# Patient Record
Sex: MALE | Race: WHITE | NOT HISPANIC OR LATINO | Employment: FULL TIME | URBAN - NONMETROPOLITAN AREA
[De-identification: names, ages, dates, MRNs, and addresses within clinical notes are randomized per-mention and may not be internally consistent; named-entity substitution may affect disease eponyms.]

---

## 2017-02-19 ENCOUNTER — OFFICE VISIT - GICH (OUTPATIENT)
Dept: FAMILY MEDICINE | Facility: OTHER | Age: 42
End: 2017-02-19

## 2017-02-19 ENCOUNTER — HISTORY (OUTPATIENT)
Dept: FAMILY MEDICINE | Facility: OTHER | Age: 42
End: 2017-02-19

## 2017-02-19 DIAGNOSIS — H10.32 ACUTE CONJUNCTIVITIS OF LEFT EYE: ICD-10-CM

## 2017-10-20 ENCOUNTER — AMBULATORY - GICH (OUTPATIENT)
Dept: RADIOLOGY | Facility: OTHER | Age: 42
End: 2017-10-20

## 2017-10-20 DIAGNOSIS — N50.9 DISORDER OF MALE GENITAL ORGANS: ICD-10-CM

## 2017-10-24 ENCOUNTER — HISTORY (OUTPATIENT)
Dept: UROLOGY | Facility: OTHER | Age: 42
End: 2017-10-24

## 2017-10-24 ENCOUNTER — AMBULATORY - GICH (OUTPATIENT)
Dept: UROLOGY | Facility: OTHER | Age: 42
End: 2017-10-24

## 2017-10-24 ENCOUNTER — AMBULATORY - GICH (OUTPATIENT)
Dept: RADIOLOGY | Facility: OTHER | Age: 42
End: 2017-10-24

## 2017-10-24 ENCOUNTER — HOSPITAL ENCOUNTER (OUTPATIENT)
Dept: RADIOLOGY | Facility: OTHER | Age: 42
End: 2017-10-24

## 2017-10-24 DIAGNOSIS — N50.9 DISORDER OF MALE GENITAL ORGANS: ICD-10-CM

## 2017-10-24 DIAGNOSIS — F17.200 NICOTINE DEPENDENCE, UNCOMPLICATED: ICD-10-CM

## 2017-10-24 DIAGNOSIS — L30.9 DERMATITIS: ICD-10-CM

## 2017-10-24 DIAGNOSIS — R39.89 OTHER SYMPTOMS AND SIGNS INVOLVING THE GENITOURINARY SYSTEM: ICD-10-CM

## 2017-10-24 DIAGNOSIS — N20.0 CALCULUS OF KIDNEY: ICD-10-CM

## 2017-10-30 ENCOUNTER — OFFICE VISIT - GICH (OUTPATIENT)
Dept: UROLOGY | Facility: OTHER | Age: 42
End: 2017-10-30

## 2017-10-30 ENCOUNTER — HISTORY (OUTPATIENT)
Dept: UROLOGY | Facility: OTHER | Age: 42
End: 2017-10-30

## 2017-10-30 DIAGNOSIS — N50.3 CYST OF EPIDIDYMIS: ICD-10-CM

## 2017-12-28 NOTE — PROGRESS NOTES
Patient Information     Patient Name MRN Sex Ronni Jiang [id number] Male 1975      Progress Notes by Kaden Chen MD at 10/30/2017  8:45 AM     Author:  Kaden Chen MD Service:  (none) Author Type:  Physician     Filed:  10/30/2017  9:20 AM Encounter Date:  10/30/2017 Status:  Signed     :  Kaden Chen MD (Physician)            I was asked to see this patient by Merle Ge MD and provide my opinion about the following:  Scrotal mass    Type of Visit  Consult    Chief Complaint  Scrotal mass    HPI  Mr. Bean is a 41 y.o. male who presents with concerns regarding left testicle.  He underwent scrotal US last week.  He first noticed this last year.  It has slowly increased in size.  It is asymptomatic.    Outside hospital records reviewed in detail today      Past Medical History  He  has a past medical history of Kidney stone; Scrotal mass; and Tobacco use disorder.  Patient Active Problem List     Diagnosis  Code     Hypertension I10     Hydrocele N43.3     Tobacco abuse Z72.0     Acute bacterial conjunctivitis H10.30     Scrotal mass N50.9     Kidney stone N20.0     Tobacco use disorder F17.200     Abnormal urine color R39.89     Dermatitis L30.9       Past Surgical History  He  has a past surgical history that includes scrotal.    Medications  He has a current medication list which includes the following prescription(s): ibuprofen and triamcinolone.    Allergies  Allergies      Allergen   Reactions     Fish Oil  Nausea Only     Sulfasalazine  Nausea And Vomiting     Other [Unlisted Allergen (Include Detail In Comments)]  Other - Describe In Comment Field     Pt states almost  from MMR vaccine.       Sulfa (Sulfonamide Antibiotics)  Vomiting       Social History  He  reports that he has been smoking Cigarettes.  He has been smoking about 1.00 pack per day. He has never used smokeless tobacco. He reports that he drinks alcohol. He reports that he does not use illicit  drugs.  No drug abuse.    Family History  No family history of urologic cancers    Review of Systems  I personally reviewed the ROS with the patient.    Nursing Notes:   Teresita Solomon ERIC  10/30/2017  9:03 AM  Signed  Patient is here today for a consult for a scrotal mass, stones, Ultrasound on 10/24/17.     Review of Systems:    Weight loss:    No     Recent fever/chills:  No   Night sweats:   No  Current skin rash:  No   Recent hair loss:  No  Heat intolerance:  No   Cold intolerance:  No  Chest pain:   No   Palpitations:   No  Shortness of breath:  No   Wheezing:   No  Constipation:    No   Diarrhea:   No   Nausea:   No   Vomiting:   No   Kidney/side pain:  No   Back pain:   No  Frequent headaches:  yes   Dizziness:     No  Leg swelling:   No   Calf pain:    No    Parents, brothers or sisters with history of kidney cancer?   No  Parents, brothers or sisters with history of bladder cancer: No    Teresita Solomon LPN......................10/30/2017 8:42 AM        Physical Exam  Vitals:     10/30/17 0843   BP: 148/100   Resp: 20   Weight: 65.4 kg (144 lb 3.2 oz)     Constitutional: No acute distress.  Alert and cooperative   Head: NCAT  Eyes: Conjunctivae normal  Cardiovascular: Regular rate.  Pulmonary/Chest: Respirations are even and non-labored bilaterally, no audible wheezing  Abdominal: Soft. No distension, tenderness, masses or guarding.   Neurological: A + O x 3.  Cranial Nerves II-XII grossly intact.  Extremities: CORINA x 4, Warm. No clubbing.  No cyanosis.    Skin: Pink, warm and dry.  No visible rashes noted.  Psychiatric:  Normal mood and affect  Back:  No left CVA tenderness.  No right CVA tenderness.  Genitourinary:  Nonpalpable bladder  Left epididymal cyst, no masses o/w normal    Labs  CREATININE (mg/dL)    Date Value   06/27/2016 0.85     Imaging  Scrotal U/S  10/24/2017  I personally reviewed and interpreted the images and report.  IMPRESSION:   3.6 cm simple cyst adjacent to the left epididymal  head.     Assessment & Plan  Mr. Bean is a 41 y.o. male who presents with left simple epididymal head cyst.  Reviewed ultrasound with patient.  Review outside hospital records.  Discussed surgical versus non-surgical management.  Reassured patient  Recommended observation

## 2017-12-30 NOTE — NURSING NOTE
Patient Information     Patient Name MRN Sex Ronni Jiang [id number] Male 1975      Nursing Note by Teresita Solomon at 10/30/2017  8:45 AM     Author:  Teresita Solomon Service:  (none) Author Type:  (none)     Filed:  10/30/2017  9:03 AM Encounter Date:  10/30/2017 Status:  Signed     :  Teresita Solomon            Patient is here today for a consult for a scrotal mass, stones, Ultrasound on 10/24/17.     Review of Systems:    Weight loss:    No     Recent fever/chills:  No   Night sweats:   No  Current skin rash:  No   Recent hair loss:  No  Heat intolerance:  No   Cold intolerance:  No  Chest pain:   No   Palpitations:   No  Shortness of breath:  No   Wheezing:   No  Constipation:    No   Diarrhea:   No   Nausea:   No   Vomiting:   No   Kidney/side pain:  No   Back pain:   No  Frequent headaches:  yes   Dizziness:     No  Leg swelling:   No   Calf pain:    No    Parents, brothers or sisters with history of kidney cancer?   No  Parents, brothers or sisters with history of bladder cancer: No    Teresita Solomon LPN......................10/30/2017 8:42 AM

## 2018-01-03 NOTE — PROGRESS NOTES
"Patient Information     Patient Name MRN Sex Ronni Jiang [id number] Male 1975      Progress Notes by Jonathon Rai MD at 2017 10:00 AM     Author:  Jonathon Rai MD Service:  (none) Author Type:  Physician     Filed:  2017 10:32 AM Encounter Date:  2017 Status:  Signed     :  Jonathon Rai MD (Physician)            SUBJECTIVE:    Ronni Bean is a 41 y.o. male who presents for pink eye    Conjunctivitis    The current episode started yesterday. The problem occurs continuously. The problem is moderate. Associated symptoms include eye itching, ear discharge, rhinorrhea (flu 2 weeks ago and cold recently ), URI, eye discharge (green) and eye redness. Pertinent negatives include no eye pain (reports itching ).       Allergies      Allergen   Reactions     Other [Unlisted Allergen (Include Detail In Comments)]  Other - Describe In Comment Field     Pt states almost  from MMR vaccine.       Sulfa (Sulfonamide Antibiotics)  Vomiting    and No family history on file.    REVIEW OF SYSTEMS:  Review of Systems   HENT: Positive for ear discharge and rhinorrhea (flu 2 weeks ago and cold recently ).    Eyes: Positive for discharge (green), redness and itching. Negative for pain (reports itching ).       OBJECTIVE:  /92  Temp 97.4  F (36.3  C) (Tympanic)  Resp 16  Ht 1.676 m (5' 6\")  Wt 64.2 kg (141 lb 9.6 oz)  BMI 22.85 kg/m2    EXAM:   Physical Exam   Constitutional: He is well-developed, well-nourished, and in no distress.   Eyes: Left eye exhibits chemosis (mild ) and discharge (watery ). Left eye exhibits no exudate. Right conjunctiva is not injected. Right conjunctiva has no hemorrhage. Left conjunctiva is injected. Left conjunctiva has no hemorrhage. Right pupil is reactive. Left pupil is reactive.       ASSESSMENT/PLAN:    ICD-10-CM    1. Acute bacterial conjunctivitis of left eye H10.32 erythromycin ophthalmic ointment 0.5%        Plan:  Follow up if getting " worse

## 2018-01-27 VITALS
SYSTOLIC BLOOD PRESSURE: 140 MMHG | SYSTOLIC BLOOD PRESSURE: 148 MMHG | BODY MASS INDEX: 22.76 KG/M2 | DIASTOLIC BLOOD PRESSURE: 100 MMHG | WEIGHT: 141.6 LBS | RESPIRATION RATE: 20 BRPM | RESPIRATION RATE: 16 BRPM | HEIGHT: 66 IN | DIASTOLIC BLOOD PRESSURE: 92 MMHG | WEIGHT: 144.2 LBS | BODY MASS INDEX: 23.27 KG/M2 | TEMPERATURE: 97.4 F

## 2018-02-28 ENCOUNTER — DOCUMENTATION ONLY (OUTPATIENT)
Dept: FAMILY MEDICINE | Facility: OTHER | Age: 43
End: 2018-02-28

## 2018-02-28 PROBLEM — R39.89 ABNORMAL URINE COLOR: Status: ACTIVE | Noted: 2017-10-24

## 2018-02-28 PROBLEM — L30.9 DERMATITIS: Status: ACTIVE | Noted: 2017-10-24

## 2018-02-28 PROBLEM — N50.3 EPIDIDYMAL CYST: Status: ACTIVE | Noted: 2017-10-30

## 2018-02-28 PROBLEM — N20.0 KIDNEY STONE: Status: ACTIVE | Noted: 2018-02-28

## 2018-02-28 PROBLEM — H10.30 ACUTE BACTERIAL CONJUNCTIVITIS: Status: ACTIVE | Noted: 2017-02-19

## 2018-02-28 PROBLEM — F17.200 TOBACCO USE DISORDER: Status: ACTIVE | Noted: 2018-02-28

## 2018-02-28 PROBLEM — N50.89 SCROTAL MASS: Status: ACTIVE | Noted: 2018-02-28

## 2018-02-28 RX ORDER — IBUPROFEN 200 MG
200 TABLET ORAL EVERY 6 HOURS
COMMUNITY

## 2018-02-28 RX ORDER — TRIAMCINOLONE ACETONIDE 1 MG/G
CREAM TOPICAL
COMMUNITY
Start: 2017-10-19 | End: 2019-09-13

## 2019-09-13 ENCOUNTER — OFFICE VISIT (OUTPATIENT)
Dept: FAMILY MEDICINE | Facility: OTHER | Age: 44
End: 2019-09-13
Attending: NURSE PRACTITIONER
Payer: COMMERCIAL

## 2019-09-13 VITALS
DIASTOLIC BLOOD PRESSURE: 110 MMHG | HEIGHT: 66 IN | BODY MASS INDEX: 23.66 KG/M2 | RESPIRATION RATE: 20 BRPM | HEART RATE: 68 BPM | TEMPERATURE: 97.6 F | OXYGEN SATURATION: 96 % | WEIGHT: 147.2 LBS | SYSTOLIC BLOOD PRESSURE: 158 MMHG

## 2019-09-13 DIAGNOSIS — J22 LOWER RESPIRATORY INFECTION: Primary | ICD-10-CM

## 2019-09-13 DIAGNOSIS — R05.9 COUGH: ICD-10-CM

## 2019-09-13 PROCEDURE — 25000125 ZZHC RX 250: Performed by: PHYSICIAN ASSISTANT

## 2019-09-13 PROCEDURE — 99214 OFFICE O/P EST MOD 30 MIN: CPT | Performed by: PHYSICIAN ASSISTANT

## 2019-09-13 PROCEDURE — 94640 AIRWAY INHALATION TREATMENT: CPT | Performed by: PHYSICIAN ASSISTANT

## 2019-09-13 RX ORDER — PREDNISONE 20 MG/1
40 TABLET ORAL DAILY
Qty: 8 TABLET | Refills: 0 | Status: SHIPPED | OUTPATIENT
Start: 2019-09-14 | End: 2019-12-23

## 2019-09-13 RX ORDER — AZITHROMYCIN 250 MG/1
TABLET, FILM COATED ORAL
Qty: 6 TABLET | Refills: 0 | Status: SHIPPED | OUTPATIENT
Start: 2019-09-13 | End: 2019-12-23

## 2019-09-13 RX ORDER — LISINOPRIL 10 MG/1
10 TABLET ORAL DAILY
Refills: 3 | COMMUNITY
Start: 2019-07-24 | End: 2024-07-16

## 2019-09-13 RX ORDER — DEXAMETHASONE SODIUM PHOSPHATE 4 MG/ML
10 VIAL (ML) INJECTION ONCE
Status: COMPLETED | OUTPATIENT
Start: 2019-09-13 | End: 2019-09-13

## 2019-09-13 RX ORDER — BENZONATATE 200 MG/1
200 CAPSULE ORAL 3 TIMES DAILY PRN
Qty: 21 CAPSULE | Refills: 0 | Status: SHIPPED | OUTPATIENT
Start: 2019-09-13 | End: 2019-12-23

## 2019-09-13 RX ORDER — CODEINE PHOSPHATE AND GUAIFENESIN 10; 100 MG/5ML; MG/5ML
1-2 SOLUTION ORAL EVERY 4 HOURS PRN
Qty: 120 ML | Refills: 0 | Status: SHIPPED | OUTPATIENT
Start: 2019-09-13 | End: 2019-12-23

## 2019-09-13 RX ORDER — IPRATROPIUM BROMIDE AND ALBUTEROL SULFATE 2.5; .5 MG/3ML; MG/3ML
3 SOLUTION RESPIRATORY (INHALATION) ONCE
Status: COMPLETED | OUTPATIENT
Start: 2019-09-13 | End: 2019-09-13

## 2019-09-13 RX ORDER — ALBUTEROL SULFATE 90 UG/1
2 AEROSOL, METERED RESPIRATORY (INHALATION) EVERY 4 HOURS PRN
Qty: 6.7 G | Refills: 0 | Status: SHIPPED | OUTPATIENT
Start: 2019-09-13

## 2019-09-13 RX ADMIN — IPRATROPIUM BROMIDE AND ALBUTEROL SULFATE 3 ML: .5; 3 SOLUTION RESPIRATORY (INHALATION) at 20:21

## 2019-09-13 RX ADMIN — DEXAMETHASONE SODIUM PHOSPHATE 10 MG: 4 INJECTION, SOLUTION INTRAMUSCULAR; INTRAVENOUS at 20:21

## 2019-09-13 ASSESSMENT — PAIN SCALES - GENERAL: PAINLEVEL: NO PAIN (0)

## 2019-09-13 ASSESSMENT — MIFFLIN-ST. JEOR: SCORE: 1500.44

## 2019-09-14 NOTE — PATIENT INSTRUCTIONS
Lower respiratory illness  Rest, fluids  Humidifier, vicks vapor rub  Start Azithromycin 250 mg oral tablet, take 2 tablets day 1, 1 tablet day 2-5  Start prednisone 20 mg oral tablet, take 2 tablets in AM with food for 4 days. Start tomorrow.   Start Albuterol inhaler 1-2 puffs every 4-6 hours as needed for cough, shortness of breath, wheezing  OTC mucinex as directed  Benzonatate 200 mg oral capsule take one capsule 3 times daily as needed  Robitussin AC, take 1-2 tsp every 4-6 hours as needed for cough, this can cause drowsiness. Do not mix with alcohol or drive while on this mediations.   Follow up with PCP if symptoms persist or worsen  Seek immediate care for    You don t get better within the first 48 hours of treatment    Shortness of breath gets worse    Rapid breathing (more than 25 breaths per minute)    Coughing up blood    Chest pain gets worse with breathing    Fever of 100.4 F (38 C) or higher that doesn t get better with fever medicine    Weakness, dizziness, or fainting that gets worse    Thirst or dry mouth that gets worse    Sinus pain, headache, or a stiff neck    Chest pain not caused by coughing      Patient Education     Bronchitis, Antibiotic Treatment (Adult)    Bronchitis is an infection of the air passages (bronchial tubes) in your lungs. It often occurs when you have a cold. This illness is contagious during the first few days and is spread through the air by coughing and sneezing, or by direct contact (touching the sick person and then touching your own eyes, nose, or mouth).  Symptoms of bronchitis include cough with mucus (phlegm) and low-grade fever. Bronchitis usually lasts 7 to 14 days. Mild cases can be treated with simple home remedies. More severe infection is treated with an antibiotic.  Home care  Follow these guidelines when caring for yourself at home:    If your symptoms are severe, rest at home for the first 2 to 3 days. When you go back to your usual activities, don't let  yourself get too tired.    Don't smoke. Also stay away from secondhand smoke.    You may use over-the-counter medicines to control fever or pain, unless another medicine was prescribed. If you have chronic liver or kidney disease or have ever had a stomach ulcer or gastrointestinal bleeding, talk with your healthcare provider before using these medicines. Also talk to your provider if you are taking medicine to prevent blood clots. Aspirin should never be given to anyone younger than 18 who is ill with a viral infection or fever. It may cause severe liver or brain damage.    Your appetite may be low, so a light diet is fine. Stay well hydrated by drinking 6 to 8 glasses of fluids per day. This includes water, soft drinks, sports drinks, juices, tea, or soup. Extra fluids will help loosen mucus in your nose and lungs.    Over-the-counter cough, cold, and sore-throat medicines will not shorten the length of the illness, but they may be helpful to reduce your symptoms. Don't use decongestants if you have high blood pressure.    Finish all antibiotic medicine. Do this even if you are feeling better after only a few days.  Follow-up care  Follow up with your healthcare provider, or as advised. If you had an X-ray or ECG (electrocardiogram), a specialist will review it. You will be told of any new test results that may affect your care.  If you are age 65 or older, if you smoke, or if you have a chronic lung disease or condition that affects your immune system, ask your healthcare provider about getting a pneumococcal vaccine and a yearly flu shot (influenza vaccine).  When to seek medical advice  Call your healthcare provider right away if any of these occur:    Fever of 100.4 F (38 C) or higher, or as directed by your healthcare provider    Coughing up more sputum    Weakness, drowsiness, headache, facial pain, ear pain, or a stiff neck  Call 911  Call 911 if any of these occur.    Coughing up blood    Weakness,  drowsiness, headache, or stiff neck that get worse    Trouble breathing, wheezing, or pain with breathing  Date Last Reviewed: 6/1/2018 2000-2018 [employer]. [address] [place] [address], [place] [address]. All rights reserved. This information is not intended as a substitute for professional medical care. Always follow your healthcare professional's instructions.

## 2019-09-14 NOTE — NURSING NOTE
"Chief Complaint   Patient presents with     Cough     Cough has been going on since last Friday.started to get worse Sunday night and hasnt gotten better since.  Has been using day and night quil and it hasnt been doing anything for him.     Initial BP (!) 158/110   Pulse 68   Temp 97.6  F (36.4  C) (Tympanic)   Resp 20   Ht 1.676 m (5' 6\")   Wt 66.8 kg (147 lb 3.2 oz)   SpO2 96%   BMI 23.76 kg/m   Estimated body mass index is 23.76 kg/m  as calculated from the following:    Height as of this encounter: 1.676 m (5' 6\").    Weight as of this encounter: 66.8 kg (147 lb 3.2 oz).    Medication Reconciliation: complete      Pj Cormier LPN  "

## 2019-12-23 ENCOUNTER — OFFICE VISIT (OUTPATIENT)
Dept: FAMILY MEDICINE | Facility: OTHER | Age: 44
End: 2019-12-23
Attending: NURSE PRACTITIONER
Payer: COMMERCIAL

## 2019-12-23 VITALS
DIASTOLIC BLOOD PRESSURE: 68 MMHG | RESPIRATION RATE: 18 BRPM | HEART RATE: 68 BPM | BODY MASS INDEX: 23.65 KG/M2 | SYSTOLIC BLOOD PRESSURE: 120 MMHG | WEIGHT: 147.19 LBS | HEIGHT: 66 IN | TEMPERATURE: 99.4 F | OXYGEN SATURATION: 99 %

## 2019-12-23 DIAGNOSIS — R07.0 THROAT PAIN IN ADULT: Primary | ICD-10-CM

## 2019-12-23 PROCEDURE — 99214 OFFICE O/P EST MOD 30 MIN: CPT | Performed by: NURSE PRACTITIONER

## 2019-12-23 ASSESSMENT — MIFFLIN-ST. JEOR: SCORE: 1500.39

## 2019-12-23 ASSESSMENT — PAIN SCALES - GENERAL: PAINLEVEL: SEVERE PAIN (6)

## 2019-12-23 NOTE — PROGRESS NOTES
Subjective     Ronni Bean is a 43 year old male who presents to clinic today for the following health issues:    HPI   ENT Symptoms             Symptoms: cc Present Absent Comment   Fever/Chills   x    Fatigue  x     Muscle Aches  x     Eye Irritation   x    Sneezing   x    Nasal Carlo/Drg  x  clear   Sinus Pressure/Pain  x  Both frontal and maxillary   Loss of smell   x    Dental pain   x    Sore Throat  x  On L side only   Swollen Glands   x    Ear Pain/Fullness  x  L ear pain   Cough   x    Wheeze   x    Chest Pain   x    Shortness of breath   x    Rash   x    Other   x      Symptom duration:  has had a cold since Thanksgiving- sore throat started about 8 days ago, ear pain started about 4 days ago   Symptom severity:  moderate   Treatments tried:  dayquil, nyquil, salt gargles, motrin   Contacts:  none known     Patient Active Problem List   Diagnosis     Abnormal urine color     Acute bacterial conjunctivitis     Epididymal cyst     Dermatitis     Hypertension     Kidney stone     Scrotal mass     Tobacco abuse     Tobacco use disorder     Past Surgical History:   Procedure Laterality Date     OTHER SURGICAL HISTORY      [id number],OTHER,Right inguinal approach for a enign scrotal finding in 1997       Social History     Tobacco Use     Smoking status: Current Every Day Smoker     Packs/day: 1.00     Types: Cigarettes     Smokeless tobacco: Never Used   Substance Use Topics     Alcohol use: Not Currently     Comment: Alcoholic Drinks/day: occasionaly     History reviewed. No pertinent family history.      Current Outpatient Medications   Medication Sig Dispense Refill     albuterol (PROAIR HFA/PROVENTIL HFA/VENTOLIN HFA) 108 (90 Base) MCG/ACT inhaler Inhale 2 puffs into the lungs every 4 hours as needed for shortness of breath / dyspnea or wheezing (cough) 6.7 g 0     ibuprofen (ADVIL/MOTRIN) 200 MG tablet Take 200 mg by mouth every 6 hours       lisinopril (PRINIVIL/ZESTRIL) 10 MG tablet Take 10 mg by mouth  "daily  3     Allergies   Allergen Reactions     Fish Oil Nausea     Sulfasalazine Nausea and Vomiting     No Clinical Screening - See Comments Other (See Comments)     Pt states almost  from MMR vaccine.      Sulfa Drugs Nausea and Vomiting     Reviewed and updated as needed this visit by Provider  Tobacco  Allergies  Meds  Problems  Med Hx  Surg Hx  Fam Hx  Soc Hx          Review of Systems   ROS COMP: As above      Objective    /68 (BP Location: Right arm, Patient Position: Sitting, Cuff Size: Adult Regular)   Pulse 68   Temp 99.4  F (37.4  C) (Tympanic)   Resp 18   Ht 1.676 m (5' 6\")   Wt 66.8 kg (147 lb 3 oz)   SpO2 99%   BMI 23.76 kg/m    Body mass index is 23.76 kg/m .  Physical Exam   GENERAL: healthy, alert and no distress  EYES: PERRL, EOMI and scleral injection bilaterally  HENT: normal cephalic/atraumatic, right ear: clear effusion, left ear: clear effusion and erythematous, oral mucous membranes moist, sinuses: not tender and L soft palate and tonsillar fullness and tenderness with uvula pushed slightly to R, is able to swallow effectively  NECK: thyroid normal to palpation and L sided neck fullness under jaw  RESP: lungs clear to auscultation - no rales, rhonchi or wheezes  CV: regular rate and rhythm, normal S1 S2, no S3 or S4, no murmur, click or rub, no peripheral edema and peripheral pulses strong  PSYCH: mentation appears normal, affect normal/bright    Diagnostic Test Results:  Labs reviewed in [facility]  CT ordered to assess for peritonsillar abscess, pt declined        Assessment & Plan     1. Throat pain in adult  Has had URI since , taking dayquil and nyquil for symptoms. About 8 days ago developed L sided throat pain with increasing fullness, L ear pain started about 4 days ago. Here for evaluation, concern for peritonsillar abscess. CT was ordered, though pt exited room and told  that he is not willing \"to do something lethal without first trying an " "antibiotic\". I spoke with him again about reasons for doing CT and concerns I have, he stated he is not willing to do the CT today and would like to be treated with antibiotics first. If he is not improving, he will return to clinic and consider CT at that time. Discussed risks and benefits, will start on augmentin and have close follow up in clinic. Discussed warning signs that would require immediate call to 911 and evaluation in ER- he verbalized understanding and denied questions at this time.   - amoxicillin-clavulanate (AUGMENTIN) 875-125 MG tablet; Take 1 tablet by mouth 2 times daily for 14 days  Dispense: 28 tablet; Refill: 0       Deonna Mata NP  [facility]        "

## 2019-12-23 NOTE — NURSING NOTE
Patient presents to clinic experiencing sore throat and pain to left side neck and ear for past 10 days.    Medication Reconciliation: complete    Erin Boswell LPN

## 2024-07-16 ENCOUNTER — APPOINTMENT (OUTPATIENT)
Dept: GENERAL RADIOLOGY | Facility: OTHER | Age: 49
End: 2024-07-16
Attending: INTERNAL MEDICINE
Payer: COMMERCIAL

## 2024-07-16 ENCOUNTER — HOSPITAL ENCOUNTER (EMERGENCY)
Facility: OTHER | Age: 49
Discharge: HOME | End: 2024-07-16
Attending: INTERNAL MEDICINE | Admitting: INTERNAL MEDICINE
Payer: COMMERCIAL

## 2024-07-16 VITALS
WEIGHT: 159 LBS | BODY MASS INDEX: 25.55 KG/M2 | OXYGEN SATURATION: 94 % | SYSTOLIC BLOOD PRESSURE: 132 MMHG | RESPIRATION RATE: 12 BRPM | TEMPERATURE: 98 F | DIASTOLIC BLOOD PRESSURE: 80 MMHG | HEIGHT: 66 IN | HEART RATE: 73 BPM

## 2024-07-16 DIAGNOSIS — R07.2 SUBSTERNAL CHEST PAIN: Primary | ICD-10-CM

## 2024-07-16 DIAGNOSIS — M54.2 CHRONIC NECK PAIN: ICD-10-CM

## 2024-07-16 DIAGNOSIS — R00.2 HEART PALPITATIONS: ICD-10-CM

## 2024-07-16 DIAGNOSIS — I10 HYPERTENSION, UNSPECIFIED TYPE: ICD-10-CM

## 2024-07-16 DIAGNOSIS — G89.29 CHRONIC NECK PAIN: ICD-10-CM

## 2024-07-16 DIAGNOSIS — Z72.0 TOBACCO ABUSE: ICD-10-CM

## 2024-07-16 DIAGNOSIS — R51.9 FREQUENT HEADACHES: ICD-10-CM

## 2024-07-16 LAB
ALBUMIN SERPL BCG-MCNC: 4 G/DL (ref 3.5–5.2)
ALP SERPL-CCNC: 118 U/L (ref 40–150)
ALT SERPL W P-5'-P-CCNC: 20 U/L (ref 0–70)
ANION GAP SERPL CALCULATED.3IONS-SCNC: 9 MMOL/L (ref 7–15)
AST SERPL W P-5'-P-CCNC: 18 U/L (ref 0–45)
BASOPHILS # BLD AUTO: 0.1 10E3/UL (ref 0–0.2)
BASOPHILS NFR BLD AUTO: 1 %
BILIRUB SERPL-MCNC: 0.4 MG/DL
BUN SERPL-MCNC: 10.4 MG/DL (ref 6–20)
CALCIUM SERPL-MCNC: 9.6 MG/DL (ref 8.6–10)
CHLORIDE SERPL-SCNC: 103 MMOL/L (ref 98–107)
CREAT SERPL-MCNC: 0.87 MG/DL (ref 0.67–1.17)
D DIMER PPP FEU-MCNC: 0.28 UG/ML FEU (ref 0–0.5)
EGFRCR SERPLBLD CKD-EPI 2021: >90 ML/MIN/1.73M2
EOSINOPHIL # BLD AUTO: 0.2 10E3/UL (ref 0–0.7)
EOSINOPHIL NFR BLD AUTO: 2 %
ERYTHROCYTE [DISTWIDTH] IN BLOOD BY AUTOMATED COUNT: 12.1 % (ref 10–15)
GLUCOSE SERPL-MCNC: 199 MG/DL (ref 70–99)
HBA1C MFR BLD: 5.5 % (ref 4–6.2)
HCO3 SERPL-SCNC: 28 MMOL/L (ref 22–29)
HCT VFR BLD AUTO: 50.8 % (ref 40–53)
HGB BLD-MCNC: 17.3 G/DL (ref 13.3–17.7)
HOLD SPECIMEN: NORMAL
IMM GRANULOCYTES # BLD: 0.1 10E3/UL
IMM GRANULOCYTES NFR BLD: 1 %
INR PPP: 1 (ref 0.85–1.15)
LYMPHOCYTES # BLD AUTO: 2.1 10E3/UL (ref 0.8–5.3)
LYMPHOCYTES NFR BLD AUTO: 16 %
MAGNESIUM SERPL-MCNC: 1.9 MG/DL (ref 1.7–2.3)
MCH RBC QN AUTO: 33.3 PG (ref 26.5–33)
MCHC RBC AUTO-ENTMCNC: 34.1 G/DL (ref 31.5–36.5)
MCV RBC AUTO: 98 FL (ref 78–100)
MONOCYTES # BLD AUTO: 1 10E3/UL (ref 0–1.3)
MONOCYTES NFR BLD AUTO: 8 %
NEUTROPHILS # BLD AUTO: 9.2 10E3/UL (ref 1.6–8.3)
NEUTROPHILS NFR BLD AUTO: 73 %
NRBC # BLD AUTO: 0 10E3/UL
NRBC BLD AUTO-RTO: 0 /100
PLATELET # BLD AUTO: 285 10E3/UL (ref 150–450)
POTASSIUM SERPL-SCNC: 3.7 MMOL/L (ref 3.4–5.3)
PROT SERPL-MCNC: 7.1 G/DL (ref 6.4–8.3)
RBC # BLD AUTO: 5.19 10E6/UL (ref 4.4–5.9)
SODIUM SERPL-SCNC: 140 MMOL/L (ref 135–145)
TROPONIN T SERPL HS-MCNC: <6 NG/L
WBC # BLD AUTO: 12.7 10E3/UL (ref 4–11)

## 2024-07-16 PROCEDURE — 99285 EMERGENCY DEPT VISIT HI MDM: CPT | Mod: 25 | Performed by: INTERNAL MEDICINE

## 2024-07-16 PROCEDURE — 71045 X-RAY EXAM CHEST 1 VIEW: CPT

## 2024-07-16 PROCEDURE — 93010 ELECTROCARDIOGRAM REPORT: CPT | Performed by: INTERNAL MEDICINE

## 2024-07-16 PROCEDURE — 96375 TX/PRO/DX INJ NEW DRUG ADDON: CPT | Performed by: INTERNAL MEDICINE

## 2024-07-16 PROCEDURE — 85610 PROTHROMBIN TIME: CPT | Performed by: INTERNAL MEDICINE

## 2024-07-16 PROCEDURE — 36415 COLL VENOUS BLD VENIPUNCTURE: CPT | Performed by: INTERNAL MEDICINE

## 2024-07-16 PROCEDURE — 84484 ASSAY OF TROPONIN QUANT: CPT | Performed by: INTERNAL MEDICINE

## 2024-07-16 PROCEDURE — 250N000011 HC RX IP 250 OP 636: Performed by: INTERNAL MEDICINE

## 2024-07-16 PROCEDURE — 99284 EMERGENCY DEPT VISIT MOD MDM: CPT | Performed by: INTERNAL MEDICINE

## 2024-07-16 PROCEDURE — 93005 ELECTROCARDIOGRAM TRACING: CPT | Performed by: INTERNAL MEDICINE

## 2024-07-16 PROCEDURE — 96374 THER/PROPH/DIAG INJ IV PUSH: CPT | Performed by: INTERNAL MEDICINE

## 2024-07-16 PROCEDURE — 83735 ASSAY OF MAGNESIUM: CPT | Performed by: INTERNAL MEDICINE

## 2024-07-16 PROCEDURE — 80053 COMPREHEN METABOLIC PANEL: CPT | Performed by: INTERNAL MEDICINE

## 2024-07-16 PROCEDURE — 85025 COMPLETE CBC W/AUTO DIFF WBC: CPT | Performed by: INTERNAL MEDICINE

## 2024-07-16 PROCEDURE — 85379 FIBRIN DEGRADATION QUANT: CPT | Performed by: INTERNAL MEDICINE

## 2024-07-16 PROCEDURE — 83036 HEMOGLOBIN GLYCOSYLATED A1C: CPT | Performed by: INTERNAL MEDICINE

## 2024-07-16 RX ORDER — MAGNESIUM HYDROXIDE/ALUMINUM HYDROXICE/SIMETHICONE 120; 1200; 1200 MG/30ML; MG/30ML; MG/30ML
30 SUSPENSION ORAL ONCE
Status: DISCONTINUED | OUTPATIENT
Start: 2024-07-16 | End: 2024-07-16

## 2024-07-16 RX ORDER — DILTIAZEM HYDROCHLORIDE 120 MG/1
120 CAPSULE, EXTENDED RELEASE ORAL DAILY
Qty: 90 CAPSULE | Refills: 0 | Status: SHIPPED | OUTPATIENT
Start: 2024-07-16

## 2024-07-16 RX ORDER — LOSARTAN POTASSIUM 100 MG/1
100 TABLET ORAL DAILY
Qty: 90 TABLET | Refills: 0 | Status: SHIPPED | OUTPATIENT
Start: 2024-07-16

## 2024-07-16 RX ORDER — DILTIAZEM HYDROCHLORIDE 5 MG/ML
10 INJECTION INTRAVENOUS ONCE
Status: COMPLETED | OUTPATIENT
Start: 2024-07-16 | End: 2024-07-16

## 2024-07-16 RX ORDER — ASPIRIN 81 MG/1
324 TABLET, CHEWABLE ORAL ONCE
Status: DISCONTINUED | OUTPATIENT
Start: 2024-07-16 | End: 2024-07-16

## 2024-07-16 RX ORDER — NITROGLYCERIN 20 MG/100ML
10-200 INJECTION INTRAVENOUS CONTINUOUS
Status: DISCONTINUED | OUTPATIENT
Start: 2024-07-16 | End: 2024-07-16

## 2024-07-16 RX ORDER — ONDANSETRON 2 MG/ML
4 INJECTION INTRAMUSCULAR; INTRAVENOUS ONCE
Status: COMPLETED | OUTPATIENT
Start: 2024-07-16 | End: 2024-07-16

## 2024-07-16 RX ADMIN — ONDANSETRON 4 MG: 2 INJECTION INTRAMUSCULAR; INTRAVENOUS at 08:06

## 2024-07-16 RX ADMIN — DILTIAZEM HYDROCHLORIDE 10 MG: 5 INJECTION INTRAVENOUS at 08:04

## 2024-07-16 ASSESSMENT — COLUMBIA-SUICIDE SEVERITY RATING SCALE - C-SSRS
2. HAVE YOU ACTUALLY HAD ANY THOUGHTS OF KILLING YOURSELF IN THE PAST MONTH?: NO
1. IN THE PAST MONTH, HAVE YOU WISHED YOU WERE DEAD OR WISHED YOU COULD GO TO SLEEP AND NOT WAKE UP?: NO
6. HAVE YOU EVER DONE ANYTHING, STARTED TO DO ANYTHING, OR PREPARED TO DO ANYTHING TO END YOUR LIFE?: NO

## 2024-07-16 ASSESSMENT — ACTIVITIES OF DAILY LIVING (ADL): ADLS_ACUITY_SCORE: 35

## 2024-07-16 NOTE — ED TRIAGE NOTES
Patient presents with chest pressure that started Saturday and has not gotten better. C/o palpitations, nausea, and Sob. No vomiting. Took 50 mg Losartan for BP. Pain does not radiate.      Triage Assessment (Adult)       Row Name 07/16/24 0717          Triage Assessment    Airway WDL WDL     Additional Documentation Breath Sounds (Group)        Respiratory WDL    Respiratory WDL WDL        Breath Sounds    Breath Sounds All Fields     All Lung Fields Breath Sounds Anterior:;equal bilaterally        Skin Circulation/Temperature WDL    Skin Circulation/Temperature WDL WDL        Cardiac WDL    Cardiac WDL WDL     Cardiac Rhythm ST        Peripheral/Neurovascular WDL    Peripheral Neurovascular WDL WDL        Cognitive/Neuro/Behavioral WDL    Cognitive/Neuro/Behavioral WDL WDL

## 2024-07-16 NOTE — DISCHARGE INSTRUCTIONS
Glad you are feeling better.    Heart enzyme was normal.  Potassium is low normal.  Magnesium is normal.      Random blood sugar is high.  Hemoglobin A1c is pending, 3-month blood sugar average.       -- Pre-diabetes:  fasting glucose: 100 - 125  hemoglobin A1c: 5.7 - 6.4   -- Diabetes:  fasting glucose greater than 125  random glucose greater than 200  hemoglobin A1c: > or = 6.5       Blood pressure checks at home - check some in AM, some in Afternoon, some in Evening and record   -- bring these with you to your next appointment.     Goal blood pressures -- less than 140 and less than 90.    -- Ideally would like the numbers about 110-130 and 70-80.  -- If running higher or lower than this on regular basis, will need to adjust your medications.          Start diltiazem 120 mg daily.    Increase losartan up to 100 mg once daily.        Substernal chest pain  -- Consider stress testing as outpatient.  Follow-up with your primary care provider.    Heart palpitations    START:   - diltiazem ER (DILT-XR) 120 MG 24 hr capsule; Take 1 capsule (120 mg) by mouth daily    Tobacco abuse    QUIT SMOKING!!     -- Choose a quit date (within 1 month). Quitting smoking abruptly is more successful than gradually cutting back.   -- Tell everyone about it (friends, family, coworkers)   -- Think about when you smoke the most, and what you'll do during those times (eg when in the car, work breaks, etc)     Consider:   -- Start varenicline (Chantix) 1 week before your quit date   -- Start bupropion (Wellbutrin/Zyban) 1 week before your quit date -- Welbutrin 1 pill daily for 1 week then 1 pill twice daily      -- Stop smoking on quit date   -- Starting with quit date, use nicotine lozenges/gum as needed for cravings     -- Quit Plan - Call them in the next 1-2 weeks to help you quit smoking.                        [phone] ([phone])                        http://www.quitplan.com   -- http://smokefree.gov/       Hypertension,  unspecified type    Dose increase:  - losartan (COZAAR) 100 MG tablet; Take 1 tablet (100 mg) by mouth daily -- Dose Increase 7/16/2024 --okay to use 2 x 50 mg tablet once daily until gone    START:   - diltiazem ER (DILT-XR) 120 MG 24 hr capsule; Take 1 capsule (120 mg) by mouth daily    Chronic neck pain    -- Try to cut down on your ibuprofen use.    Frequent headaches    Hopefully an improved blood pressure will help with your chronic headaches.        Keep follow-up with your primary provider in about 10 days as scheduled.      Results for orders placed or performed during the hospital encounter of 07/16/24   XR Chest Port 1 View     Status: None    Narrative    PROCEDURE:  XR CHEST PORT 1 VIEW    HISTORY: Chest pain. .    COMPARISON:  None.    FINDINGS:    The cardiomediastinal contours are within normal limits.  No focal consolidation, effusion or pneumothorax.      Impression    IMPRESSION:  Negative portable chest.      MOHAN REZA MD         SYSTEM ID:  [id number]   [place] Draw     Status: None    Narrative    The following orders were created for panel order [place] Draw.  Procedure                               Abnormality         Status                     ---------                               -----------         ------                     Extra Blue Top Tube[[id number]]                              Final result               Extra Red Top Tube[[id number]]                               Final result               Extra Green Top (Lithium...[[id number]]                      Final result               Extra Purple Top Tube[[id number]]                            Final result               Extra Green Top (Lithium...[[id number]]                      Final result                 Please view results for these tests on the individual orders.   Troponin T, High Sensitivity     Status: Normal   Result Value Ref Range    Troponin T, High Sensitivity <6 <=22 ng/L   Comprehensive metabolic panel     Status:  Abnormal   Result Value Ref Range    Sodium 140 135 - 145 mmol/L    Potassium 3.7 3.4 - 5.3 mmol/L    Carbon Dioxide (CO2) 28 22 - 29 mmol/L    Anion Gap 9 7 - 15 mmol/L    Urea Nitrogen 10.4 6.0 - 20.0 mg/dL    Creatinine 0.87 0.67 - 1.17 mg/dL    GFR Estimate >90 >60 mL/min/1.73m2    Calcium 9.6 8.6 - 10.0 mg/dL    Chloride 103 98 - 107 mmol/L    Glucose 199 (H) 70 - 99 mg/dL    Alkaline Phosphatase 118 40 - 150 U/L    AST 18 0 - 45 U/L    ALT 20 0 - 70 U/L    Protein Total 7.1 6.4 - 8.3 g/dL    Albumin 4.0 3.5 - 5.2 g/dL    Bilirubin Total 0.4 <=1.2 mg/dL   INR     Status: Normal   Result Value Ref Range    INR 1.00 0.85 - 1.15   D dimer quantitative     Status: Normal   Result Value Ref Range    D-Dimer Quantitative 0.28 0.00 - 0.50 ug/mL FEU    Narrative    This D-dimer assay is intended for use in conjunction with a clinical pretest probability assessment model to exclude pulmonary embolism (PE) and deep venous thrombosis (DVT) in outpatients suspected of PE or DVT. The cut-off value is 0.50 ug/mL FEU.   Extra Blue Top Tube     Status: None   Result Value Ref Range    Hold Specimen JIC    Extra Red Top Tube     Status: None   Result Value Ref Range    Hold Specimen JIC    Extra Green Top (Lithium Heparin) Tube     Status: None   Result Value Ref Range    Hold Specimen JIC    Extra Purple Top Tube     Status: None   Result Value Ref Range    Hold Specimen JIC    Extra Green Top (Lithium Heparin) ON ICE     Status: None   Result Value Ref Range    Hold Specimen JIC    CBC with platelets and differential     Status: Abnormal   Result Value Ref Range    WBC Count 12.7 (H) 4.0 - 11.0 10e3/uL    RBC Count 5.19 4.40 - 5.90 10e6/uL    Hemoglobin 17.3 13.3 - 17.7 g/dL    Hematocrit 50.8 40.0 - 53.0 %    MCV 98 78 - 100 fL    MCH 33.3 (H) 26.5 - 33.0 pg    MCHC 34.1 31.5 - 36.5 g/dL    RDW 12.1 10.0 - 15.0 %    Platelet Count 285 150 - 450 10e3/uL    % Neutrophils 73 %    % Lymphocytes 16 %    % Monocytes 8 %    %  Eosinophils 2 %    % Basophils 1 %    % Immature Granulocytes 1 %    NRBCs per 100 WBC 0 <1 /100    Absolute Neutrophils 9.2 (H) 1.6 - 8.3 10e3/uL    Absolute Lymphocytes 2.1 0.8 - 5.3 10e3/uL    Absolute Monocytes 1.0 0.0 - 1.3 10e3/uL    Absolute Eosinophils 0.2 0.0 - 0.7 10e3/uL    Absolute Basophils 0.1 0.0 - 0.2 10e3/uL    Absolute Immature Granulocytes 0.1 <=0.4 10e3/uL    Absolute NRBCs 0.0 10e3/uL   Magnesium     Status: Normal   Result Value Ref Range    Magnesium 1.9 1.7 - 2.3 mg/dL   Hemoglobin A1c     Status: Normal   Result Value Ref Range    Hemoglobin A1C 5.5 4.0 - 6.2 %   CBC with platelets differential     Status: Abnormal    Narrative    The following orders were created for panel order CBC with platelets differential.  Procedure                               Abnormality         Status                     ---------                               -----------         ------                     CBC with platelets and d...[[id number]]  Abnormal            Final result                 Please view results for these tests on the individual orders.

## 2024-07-16 NOTE — ED PROVIDER NOTES
Emergency Department Provider Note  : 1975 Age: 48 year old Sex: male MRN: [id number]    Chief Complaint   Patient presents with    Chest Pain       Medical Decision Making / Assessment / Plan   48 year old male presenting with substernal chest pain, heart palpitations, tobacco abuse    ED Course as of 24 0856      0725 Patient valuated.  Presents with mid chest, substernal chest pressure starting in the middle of the night Saturday night going into .  Associated with nausea, heart palpitations, shortness of breath.  Has chronic headaches.  Takes a lot of ibuprofen.  Did have of worsening heart pounding and shortness of breath with walking into the hospital this morning.  Pain has been constant.  3-4/10 up to 7/10, improving back to 3-4/10.  No heart history.  Smokes 0.5-1 pack/day cigarettes.  No alcohol since .  No melena, hematochezia.  Takes ibuprofen 600 mg 0 to 3-4 tablets/day due to chronic neck pain, headaches.   0726 EKG shows sinus rhythm with occasional PVCs.  Right axis deviation.  Rate 98 bpm.  Abnormal EKG.   0751 CBC returned with white blood cell count of 12,700.  Hemoglobin 17.3.  Hematocrit 50.8, platelet count 285,000.  INR 1.0.    D-dimer 0.28.   0753 Blood pressure is elevated.  164/99.  Add on magnesium level.   0754 Orders placed for:  4 mg IV Zofran   0756 Troponin returned less than 6.   0758 GI cocktail ordered.   0827 Random glucose of 199.  Potassium 3.7 magnesium 1.9.   0827 Add on hemoglobin A1c ordered.   0839 Chest x-ray appears negative.  Final read pending.   0840 Patient states that his headache is actually improved.  He is having fewer heart palpitations.  The diltiazem has been helpful.  Declines need for GI cocktail at this time.  Orders canceled.  Plan to discharge home with increased dose of losartan from 50-> 100 mg daily.    Add diltiazem 120 mg daily.  Has clinic follow-up appointment in approximately 9 days.   -Recommend follow-up  on blood sugar/hemoglobin A1c and blood pressure.  Consider stress testing, given mother had heart attack at age 44, she was non-smoker.  Patient declines ordering stress test at this time.        A shared decision making model was used. Plan and all results were discussed  Time was taken to answer all questions. Patient and/or associated parties understood and were agreeable to treatment plan.  Strict return to Emergency Department precautions as well as appropriate follow up instructions were provided. The patient was discharged in stable condition.        -- Pre-diabetes:  fasting glucose: 100 - 125  hemoglobin A1c: 5.7 - 6.4   -- Diabetes:  fasting glucose greater than 125  random glucose greater than 200  hemoglobin A1c: > or = 6.5       Blood pressure checks at home - check some in AM, some in Afternoon, some in Evening and record   -- bring these with you to your next appointment.     Goal blood pressures -- less than 140 and less than 90.    -- Ideally would like the numbers about 110-130 and 70-80.  -- If running higher or lower than this on regular basis, will need to adjust your medications.          Start diltiazem 120 mg daily.    Increase losartan up to 100 mg once daily.        Substernal chest pain  -- Consider stress testing as outpatient.  Follow-up with your primary care provider.    Heart palpitations    START:   - diltiazem ER (DILT-XR) 120 MG 24 hr capsule; Take 1 capsule (120 mg) by mouth daily    Tobacco abuse    QUIT SMOKING!!     -- Choose a quit date (within 1 month). Quitting smoking abruptly is more successful than gradually cutting back.   -- Tell everyone about it (friends, family, coworkers)   -- Think about when you smoke the most, and what you'll do during those times (eg when in the car, work breaks, etc)     Consider:   -- Start varenicline (Chantix) 1 week before your quit date   -- Start bupropion (Wellbutrin/Zyban) 1 week before your quit date -- Welbutrin 1 pill daily for 1  week then 1 pill twice daily      -- Stop smoking on quit date   -- Starting with quit date, use nicotine lozenges/gum as needed for cravings     -- Quit Plan - Call them in the next 1-2 weeks to help you quit smoking.                        [phone] ([phone])                        http://www.quitplan.RUN   -- http://smokefree.gov/       Hypertension, unspecified type    Dose increase:  - losartan (COZAAR) 100 MG tablet; Take 1 tablet (100 mg) by mouth daily -- Dose Increase 7/16/2024 --okay to use 2 x 50 mg tablet once daily until gone    START:   - diltiazem ER (DILT-XR) 120 MG 24 hr capsule; Take 1 capsule (120 mg) by mouth daily    Chronic neck pain    -- Try to cut down on your ibuprofen use.    Frequent headaches    Hopefully an improved blood pressure will help with your chronic headaches.    Keep follow-up with your primary provider in about 10 days as scheduled.      --- Hemoglobin A1c returned within normal range at 5.5%.      Discharge Medication List as of 7/16/2024  8:48 AM        START taking these medications    Details   diltiazem ER (DILT-XR) 120 MG 24 hr capsule Take 1 capsule (120 mg) by mouth daily, Disp-90 capsule, R-0, E-Prescribe      losartan (COZAAR) 100 MG tablet Take 1 tablet (100 mg) by mouth daily -- Dose Increase 7/16/2024 --okay to use 2 x 50 mg tablet once daily until gone, Disp-90 tablet, R-0, E-Prescribe             Final diagnoses:   Substernal chest pain   Heart palpitations   Tobacco abuse   Hypertension, unspecified type   Chronic neck pain   Frequent headaches       Xavier Whipple MD  7/16/2024   Emergency Department    Subjective   Ronni is a 48 year old male who presents at  7:18 AM with constant substernal chest pain since onset Saturday evening going into Sunday.  States that he woke up with feeling of a weight on the mid chest.  Had a lot of chest pressure.  States that the pressure was constant 3-4/10 with occasional worsening up to 7/10.  Ended up going back to  sleep but woke up later, in the middle of the night, with heart palpitations.  Was hoping symptoms would improve but did not resolve.    States that he woke up with a headache on Saturday in the middle of the night and has had a headache since that time.  Has some issues with chronic neck pain and facet joint issues.  Did have some nausea on Sunday.  States that the symptoms are associated with heart palpitations, nausea, shortness of breath.  Did have worsening heart pounding with walking into the building today.    No vomiting.    Blood pressures at home tend to run in the 120-130 systolic.  Currently taking losartan 50 mg once daily.    Smokes about 0.5-1 pack/day.    Has issues with chronic headaches, grinds his teeth.  Has chronic neck pain.  States that he takes a lot of ibuprofen.  Anywhere from 600 mg up to 2400 mg/day occasionally does not take any ibuprofen but lately has been having more neck pain.    Denies melena, hematochezia.  Denies any kind of heartburn symptoms.  Denies black tarry stools.  No alcohol consumption since 2012.    Mother had a heart attack at age 44 or 45.  Did not have tobacco use history.  Underwent multiple cardiac tests and stents in the past per his recollection.    Does not have any underlying blood sugar issues.  States that his mother was diagnosed with type 1 diabetes approximately age 28.    Denies fevers, chills.  No rash.  No abdominal pain.    I have reviewed the Medications, Allergies, Past Medical and Surgical History, and Social History in the [employer] [facility] and with family.    Review of Systems:  Please see Subjective / HPI for pertinent positives and negatives. All other systems reviewed and found to be negative.      Objective   Patient Vitals for the past 24 hrs:   BP Temp Temp src Pulse Resp SpO2 Height Weight   07/16/24 0845 132/80 -- -- 73 12 94 % -- --   07/16/24 0830 (!) 138/92 -- -- 82 11 95 % -- --   07/16/24 0815 (!) 145/95 -- -- 75 10 94 % -- --   07/16/24  "0730 (!) 164/99 -- -- 101 14 97 % -- --   07/16/24 0715 (!) 160/116 98  F (36.7  C) Oral 100 20 97 % 1.676 m (5' 6\") 72.1 kg (159 lb)     Physical Exam:     General: Awake, alert, in no acute respiratory distress.  Mildly anxious.  Head: Normocephalic, atraumatic.  Eyes: No conjunctival injection and normal lids. PERRL, EOMI.  ENT: Moist membranes, external ear appears normal.   Chest/Respiratory: Unlabored respiratory effort. Equal chest rise, clear bilaterally.  Cardiovascular: Peripheral pulses present, tachycardic rate, regular rhythm.  No edema.  Abdominal: Soft, non-distended, non-tender.  Extremities: No obvious long bone deformity.  No chest wall tenderness to palpation.  No costochondral tenderness to palpation.  Neurological: GCS 15, moving all extremities without gross deficit.  Skin: Warm, no rashes, lesions, or bruising.   Psychiatric: Mildly anxious, otherwise appropriate affect.     Procedures / Critical Care   Procedures    Aggregate Critical Care Time: None.     Orders Placed This Encounter   Procedures    XR Chest Port 1 View    [place] Draw    Troponin T, High Sensitivity    Comprehensive metabolic panel    INR    D dimer quantitative    Extra Blue Top Tube    Extra Red Top Tube    Extra Green Top (Lithium Heparin) Tube    Extra Purple Top Tube    Extra Green Top (Lithium Heparin) ON ICE    CBC with platelets and differential    Magnesium    Hemoglobin A1c    EKG 12 lead    EKG 12-lead, tracing only    Pulse oximetry nursing    Cardiac Continuous Monitoring    Peripheral IV: Standard    Review medications with patient    Oxygen: Nasal cannula, Oxygen mask    CBC with platelets differential       RESULTS: As noted above.            Medical/Surgical History:  Past Medical History:   Diagnosis Date    Calculus of kidney     No Comments Provided    Disorder of male genital organs     No Comments Provided    Nicotine dependence, uncomplicated     No Comments Provided     Past Surgical History: "   Procedure Laterality Date    OTHER SURGICAL HISTORY      600000,OTHER,Right inguinal approach for a enign scrotal finding in 1997       Medications:  No current facility-administered medications for this encounter.     Current Outpatient Medications   Medication Sig Dispense Refill    diltiazem ER (DILT-XR) 120 MG 24 hr capsule Take 1 capsule (120 mg) by mouth daily 90 capsule 0    losartan (COZAAR) 100 MG tablet Take 1 tablet (100 mg) by mouth daily -- Dose Increase 7/16/2024 --okay to use 2 x 50 mg tablet once daily until gone 90 tablet 0    albuterol (PROAIR HFA/PROVENTIL HFA/VENTOLIN HFA) 108 (90 Base) MCG/ACT inhaler Inhale 2 puffs into the lungs every 4 hours as needed for shortness of breath / dyspnea or wheezing (cough) 6.7 g 0    ibuprofen (ADVIL/MOTRIN) 200 MG tablet Take 200 mg by mouth every 6 hours         Allergies:  Fish oil, Sulfasalazine, No clinical screening - see comments, and Sulfa antibiotics    Nursing notes were reviewed.  Past medical history, past surgical history, problem list, family history, social history, medication list, and allergies were reviewed as documented in epic snapshot. Relevant review of systems are documented within the HPI above.         Xavier Whipple MD  07/16/24 0883

## 2024-07-18 LAB
ATRIAL RATE - MUSE: 98 BPM
DIASTOLIC BLOOD PRESSURE - MUSE: NORMAL MMHG
INTERPRETATION ECG - MUSE: NORMAL
P AXIS - MUSE: 62 DEGREES
PR INTERVAL - MUSE: 128 MS
QRS DURATION - MUSE: 82 MS
QT - MUSE: 338 MS
QTC - MUSE: 431 MS
R AXIS - MUSE: 128 DEGREES
SYSTOLIC BLOOD PRESSURE - MUSE: NORMAL MMHG
T AXIS - MUSE: 47 DEGREES
VENTRICULAR RATE- MUSE: 98 BPM

## 2024-09-21 ENCOUNTER — HEALTH MAINTENANCE LETTER (OUTPATIENT)
Age: 49
End: 2024-09-21

## (undated) RX ORDER — MAGNESIUM HYDROXIDE/ALUMINUM HYDROXICE/SIMETHICONE 120; 1200; 1200 MG/30ML; MG/30ML; MG/30ML
SUSPENSION ORAL
Status: DISPENSED
Start: 2024-07-16

## (undated) RX ORDER — DILTIAZEM HYDROCHLORIDE 5 MG/ML
INJECTION INTRAVENOUS
Status: DISPENSED
Start: 2024-07-16

## (undated) RX ORDER — ONDANSETRON 2 MG/ML
INJECTION INTRAMUSCULAR; INTRAVENOUS
Status: DISPENSED
Start: 2024-07-16